# Patient Record
Sex: FEMALE | Race: WHITE | URBAN - METROPOLITAN AREA
[De-identification: names, ages, dates, MRNs, and addresses within clinical notes are randomized per-mention and may not be internally consistent; named-entity substitution may affect disease eponyms.]

---

## 2022-12-28 ENCOUNTER — OFFICE VISIT (OUTPATIENT)
Dept: URBAN - METROPOLITAN AREA CLINIC 91 | Facility: CLINIC | Age: 32
End: 2022-12-28
Payer: COMMERCIAL

## 2022-12-28 DIAGNOSIS — H00.14 CHALAZION LEFT UPPER EYELID: Primary | ICD-10-CM

## 2022-12-28 PROCEDURE — 99203 OFFICE O/P NEW LOW 30 MIN: CPT | Performed by: OPHTHALMOLOGY

## 2022-12-28 RX ORDER — NEOMYCIN SULFATE, POLYMYXIN B SULFATE AND DEXAMETHASONE 3.5; 10000; 1 MG/G; [USP'U]/G; MG/G
OINTMENT OPHTHALMIC
Qty: 3.5 | Refills: 0 | Status: ACTIVE
Start: 2022-12-28

## 2022-12-28 NOTE — IMPRESSION/PLAN
Impression: Chalazion left upper eyelid: H00.14. Plan: For chalazia ROSE, I have recommended warm compresses 4 times per day for 10-15 minutes at a time. I have also given patient Rx for an ophthalmic ointment to use. We will consider minor office procedure to drain chalazion in the near future if patient shows no improvement at next exam. Start Maxitrol adeline QHS OS.